# Patient Record
Sex: FEMALE | Race: WHITE
[De-identification: names, ages, dates, MRNs, and addresses within clinical notes are randomized per-mention and may not be internally consistent; named-entity substitution may affect disease eponyms.]

---

## 2019-01-14 ENCOUNTER — HOSPITAL ENCOUNTER (INPATIENT)
Dept: HOSPITAL 74 - YASAS | Age: 54
LOS: 15 days | Discharge: HOME | DRG: 772 | End: 2019-01-29
Attending: PSYCHIATRY & NEUROLOGY | Admitting: PSYCHIATRY & NEUROLOGY
Payer: COMMERCIAL

## 2019-01-14 VITALS — BODY MASS INDEX: 37 KG/M2

## 2019-01-14 DIAGNOSIS — K21.9: ICD-10-CM

## 2019-01-14 DIAGNOSIS — B18.2: ICD-10-CM

## 2019-01-14 DIAGNOSIS — F11.20: ICD-10-CM

## 2019-01-14 DIAGNOSIS — R26.2: ICD-10-CM

## 2019-01-14 DIAGNOSIS — J06.9: ICD-10-CM

## 2019-01-14 DIAGNOSIS — F14.20: Primary | ICD-10-CM

## 2019-01-14 DIAGNOSIS — J01.90: ICD-10-CM

## 2019-01-14 DIAGNOSIS — Z86.69: ICD-10-CM

## 2019-01-14 DIAGNOSIS — E66.9: ICD-10-CM

## 2019-01-14 DIAGNOSIS — R50.9: ICD-10-CM

## 2019-01-14 DIAGNOSIS — J45.909: ICD-10-CM

## 2019-01-14 DIAGNOSIS — F17.210: ICD-10-CM

## 2019-01-14 DIAGNOSIS — Z99.89: ICD-10-CM

## 2019-01-14 DIAGNOSIS — M54.40: ICD-10-CM

## 2019-01-14 DIAGNOSIS — R76.11: ICD-10-CM

## 2019-01-14 DIAGNOSIS — F31.81: ICD-10-CM

## 2019-01-14 LAB
APPEARANCE UR: CLEAR
BILIRUB UR STRIP.AUTO-MCNC: NEGATIVE MG/DL
COLOR UR: (no result)
EPITH CASTS URNS QL MICRO: (no result) /HPF
KETONES UR QL STRIP: NEGATIVE
LEUKOCYTE ESTERASE UR QL STRIP.AUTO: (no result)
NITRITE UR QL STRIP: NEGATIVE
PH UR: 7 [PH] (ref 5–8)
PROT UR QL STRIP: NEGATIVE
PROT UR QL STRIP: NEGATIVE
SP GR UR: 1.01 (ref 1.01–1.03)
UROBILINOGEN UR STRIP-MCNC: NEGATIVE MG/DL (ref 0.2–1)

## 2019-01-14 PROCEDURE — HZ42ZZZ GROUP COUNSELING FOR SUBSTANCE ABUSE TREATMENT, COGNITIVE-BEHAVIORAL: ICD-10-PCS | Performed by: PSYCHIATRY & NEUROLOGY

## 2019-01-14 RX ADMIN — Medication SCH MG: at 21:15

## 2019-01-14 RX ADMIN — GABAPENTIN SCH MG: 300 CAPSULE ORAL at 21:15

## 2019-01-14 RX ADMIN — Medication PRN MG: at 21:15

## 2019-01-14 RX ADMIN — GABAPENTIN SCH MG: 300 CAPSULE ORAL at 17:28

## 2019-01-14 RX ADMIN — NICOTINE POLACRILEX PRN MG: 4 GUM, CHEWING ORAL at 17:29

## 2019-01-14 NOTE — HP
CIWA Score





- Admission Criteria


[facility] Guidelines: Admission for Medically Managed Detox: 


Requires at least one of the followin. CIWA greater than 12


2. Seizures within the past 24 hours


3. Delirium tremens within the past 24 hours


4. Hallucinations within the past 24 hours


5. Acute intervention needed for co  occurring medical disorder


6. Acute intervention needed for co  occurring psychiatric disorder


7. Severe withdrawal that cannot be handled at a lower level of care (continued


    vomiting, continued diarrhea, abnormal vital signs) requiring intravenous


    medication and/or fluids


8. Pregnancy








Admission ROS S





- HPI


Allergies/Adverse Reactions: 


 Allergies











Allergy/AdvReac Type Severity Reaction Status Date / Time


 


No Known Allergies Allergy   Verified 19 11:14











Exam Limitations: No Limitations





- Ebola screening


Have you traveled outside of the country in the last 21 days: No


Have you had contact with anyone from an Ebola affected area: No


Have you been sick,other than usual withdrawal symptoms: No


Do you have a fever: No





- Review of Systems


Constitutional: Changes in sleep


EENT: reports: No Symptoms Reported


Respiratory: reports: No Symptoms reported


Cardiac: reports: No Symptoms Reported


GI: reports: Diarrhea, Poor Fluid Intake


: reports: No Symptoms Reported


Musculoskeletal: reports: Back Pain, Muscle Pain


Integumentary: reports: No Symptoms Reported


Neuro: reports: Seizure (drug and alcohol related seizures)


Endocrine: reports: No Symptoms Reported


Hematology: reports: No Symptoms Reported


Psychiatric: reports: Judgement Intact, Mood/Affect Appropiate, Orientated x3, 

Agitated, Anxious


Other Systems: Reviewed and Negative





Patient History





- Patient Medical History


Hx Anemia: No


Hx Asthma: Yes


Hx Chronic Obstructive Pulmonary Disease (COPD): No


Hx Cancer: No


Hx Cardiac Disorders: No


Hx Congestive Heart Failure: No


Hx Hypertension: No


Hx Hypercholesterolemia: No


Hx Pacemaker: No


HX Cerebrovascular Accident: No


Hx Seizures: Yes (drug related - last episode was in 2018)


Hx Dementia: No


Hx Diabetes: No


Hx Gastrointestinal Disorders: Yes (acid reflux)


Hx Genitourinary Disorders: No


Hx Sexually Transmitted Disorders: No


Hx Renal Disease (ESRD): No


Hx Thyroid Disease: No


Hx Human Immunodeficiency Virus (HIV): No (NEGATIVE HX)


Hx Hepatitis C: Yes (no treatment)


Hx Depression: Yes


Hx Suicide Attempt: No (10yrs ago )


Hx Bipolar Disorder: No


Hx Schizophrenia: No





- Patient Surgical History


Past Surgical History: No


Hx Neurologic Surgery: No


Hx Cataract Extraction: No


Hx Cardiac Surgery: No


Hx Lung Surgery: No


Hx Breast Surgery: No


Hx Breast Biopsy: No


Hx Abdominal Surgery: No


Hx Appendectomy: No


Hx Cholecystectomy: No


Hx Genitourinary Surgery: No


Hx  Section: No


Hx Orthopedic Surgery: No


Anesthesia Reaction: No





- PPD History


Previous Implant?: Yes


Documented Results: Positive w/o proof


Implanted On Prior [facility] Admission?: No


PPD to be Administered?: No





- Reproductive History


Patient is a Female of Child Bearing Age (11 -55 yrs old): No


Patient Pregnant: No





- Smoking Cessation


Smoking history: Current every day smoker


Have you smoked in the past 12 months: Yes


Aproximately how many cigarettes per day: 30


Hx Chewing Tobacco Use: No


Initiated information on smoking cessation: Yes


'Breaking Loose' booklet given: 19





- Substance & Tx. History


Hx Alcohol Use: Yes


Hx Substance Use: Yes


Substance Use Type: Alcohol, Cocaine


Hx Substance Use Treatment: Yes ([facility] completed 18)





- Substances Abused


  ** Cocaine


Route: Inhalation


Frequency: Daily


Amount used: $100


Age of first use: 22


Date of Last Use: 19





  ** Alcohol-beer


Route: Oral


Frequency: Daily


Amount used: 2-6 pks.


Age of first use: 21


Date of Last Use: 19





Family Disease History





- Family Disease History


Family Disease History: Diabetes: Father (ALCOHOLISM; DEPRESSION), Other: Father





Admission Physical Exam [facility]





- Vital Signs


Vital Signs: 


 Vital Signs - 24 hr











  19





  10:34


 


Temperature 98.3 F


 


Pulse Rate 69


 


Respiratory 16





Rate 


 


Blood Pressure 141/83














- Physical


General Appearance: Yes: Appropriately Dressed, Mild Distress, Obese, Tremorous

, Irritable, Anxious


HEENTM: Yes: Hearing grossly Normal, Normal Voice


Respiratory: Yes: Lungs Clear, Normal Breath Sounds, No Respiratory Distress


Neck: Yes: No masses,lesions,Nodules


Breast: Yes: Within Normal Limits


Cardiology: Yes: Regular Rhythm, Regular Rate, S1, S2


Abdominal: Yes: Normal Bowel Sounds, Non Tender, Soft


Genitourinary: Yes: Within Normal Limits


Back: Yes: Normal Inspection


Musculoskeletal: Yes: Back pain


Extremities: Yes: Normal Capillary Refill, Normal Inspection, Non-Tender, 

Tremors


Neurological: Yes: Fully Oriented, Alert, Normal Response


Integumentary: Yes: Diaphoresis


Lymphatic: Yes: Within Normal Limits





- Diagnostic


(1) Bipolar II disorder


Current Visit: Yes   Status: Chronic   





(2) Cocaine dependence


Current Visit: Yes   Status: Chronic   


Qualifiers: 


   Substance use status: uncomplicated   Qualified Code(s): F14.20 - Cocaine 

dependence, uncomplicated   





(3) Nicotine dependence


Current Visit: Yes   Status: Chronic   


Qualifiers: 


   Nicotine product type: cigarettes   Substance use status: uncomplicated   

Qualified Code(s): F17.210 - Nicotine dependence, cigarettes, uncomplicated   





(4) Depression


Current Visit: No   Status: Chronic   





(5) GERD (gastroesophageal reflux disease)


Current Visit: Yes   Status: Chronic   


Qualifiers: 


   Esophagitis presence: without esophagitis   Qualified Code(s): K21.9 - Gastro

-esophageal reflux disease without esophagitis   





(6) Seizure disorder


Current Visit: No   Status: Suspected   





Cleared for Admission [facility]





- Detox or Rehab


[facility] Level of Care: Medically Managed





[facility] Breath Alcohol Content


Breath Alcohol Content: 0





Urine Pregancy Test





- Result


Urine Pregnancy Test Results: Negative- NO Line Present





Urine Drug Screen





- Results


Drug Screen Negative: No


Urine Drug Screen Results: BZO-Benzodiazepines, MTD-Methadone





Inpatient Rehab Admission





- Initial Determination


Are CD services needed?: Yes


Free of communicable disease: Yes


Not in need of hospitalization: Yes





- Rehab Admission Criteria


Poor recovery environment: Yes


Comorbidities: Yes


Lacks judgement: Yes

## 2019-01-14 NOTE — PN
BHS Progress Note


Note: 





Psychiatric nurse practitioner note:


Call received by RN requesting patient's evening lithium dose. Patient reports 

taking lithium 150mg TID.  Chart and pharmacy claims reviewed. Labs pending. 

Unable to verify medication through pharmacy claims. Lithium WILL NOT be 

ordered at this time. Will order Lithium level for the morning.

## 2019-01-15 LAB
ALBUMIN SERPL-MCNC: 3.7 G/DL (ref 3.4–5)
ALP SERPL-CCNC: 84 U/L (ref 45–117)
ALT SERPL-CCNC: 29 U/L (ref 13–61)
ANION GAP SERPL CALC-SCNC: 4 MMOL/L (ref 8–16)
AST SERPL-CCNC: 25 U/L (ref 15–37)
BILIRUB SERPL-MCNC: 0.3 MG/DL (ref 0.2–1)
BUN SERPL-MCNC: 9 MG/DL (ref 7–18)
CALCIUM SERPL-MCNC: 8.9 MG/DL (ref 8.5–10.1)
CHLORIDE SERPL-SCNC: 105 MMOL/L (ref 98–107)
CO2 SERPL-SCNC: 32 MMOL/L (ref 21–32)
CREAT SERPL-MCNC: 0.8 MG/DL (ref 0.55–1.3)
DEPRECATED RDW RBC AUTO: 15 % (ref 11.6–15.6)
GLUCOSE SERPL-MCNC: 74 MG/DL (ref 74–106)
HCT VFR BLD CALC: 37.7 % (ref 32.4–45.2)
HGB BLD-MCNC: 12.3 GM/DL (ref 10.7–15.3)
MCH RBC QN AUTO: 29.4 PG (ref 25.7–33.7)
MCHC RBC AUTO-ENTMCNC: 32.6 G/DL (ref 32–36)
MCV RBC: 90.1 FL (ref 80–96)
PLATELET # BLD AUTO: 165 K/MM3 (ref 134–434)
PMV BLD: 10.3 FL (ref 7.5–11.1)
POTASSIUM SERPLBLD-SCNC: 4 MMOL/L (ref 3.5–5.1)
PROT SERPL-MCNC: 7.5 G/DL (ref 6.4–8.2)
RBC # BLD AUTO: 4.19 M/MM3 (ref 3.6–5.2)
SODIUM SERPL-SCNC: 140 MMOL/L (ref 136–145)
WBC # BLD AUTO: 4.3 K/MM3 (ref 4–10)

## 2019-01-15 RX ADMIN — LITHIUM CARBONATE SCH MG: 150 CAPSULE, GELATIN COATED ORAL at 21:22

## 2019-01-15 RX ADMIN — Medication PRN MG: at 21:23

## 2019-01-15 RX ADMIN — CYCLOBENZAPRINE HYDROCHLORIDE PRN MG: 10 TABLET, FILM COATED ORAL at 21:22

## 2019-01-15 RX ADMIN — Medication SCH MG: at 21:20

## 2019-01-15 RX ADMIN — METHADONE HYDROCHLORIDE SCH MG: 40 TABLET ORAL at 07:49

## 2019-01-15 RX ADMIN — GABAPENTIN SCH MG: 300 CAPSULE ORAL at 14:00

## 2019-01-15 RX ADMIN — GABAPENTIN SCH MG: 300 CAPSULE ORAL at 21:23

## 2019-01-15 RX ADMIN — PANTOPRAZOLE SODIUM SCH MG: 40 TABLET, DELAYED RELEASE ORAL at 09:21

## 2019-01-15 RX ADMIN — VENLAFAXINE HYDROCHLORIDE SCH MG: 75 TABLET ORAL at 09:20

## 2019-01-15 RX ADMIN — GABAPENTIN SCH MG: 300 CAPSULE ORAL at 06:33

## 2019-01-15 RX ADMIN — NICOTINE SCH MG: 21 PATCH TRANSDERMAL at 09:20

## 2019-01-15 RX ADMIN — Medication SCH TAB: at 09:21

## 2019-01-15 RX ADMIN — CYCLOBENZAPRINE HYDROCHLORIDE PRN MG: 10 TABLET, FILM COATED ORAL at 14:01

## 2019-01-15 RX ADMIN — Medication SCH APPLIC: at 21:25

## 2019-01-15 RX ADMIN — ALBUTEROL SULFATE PRN PUFF: 90 AEROSOL, METERED RESPIRATORY (INHALATION) at 15:17

## 2019-01-15 RX ADMIN — NICOTINE POLACRILEX PRN MG: 4 GUM, CHEWING ORAL at 14:02

## 2019-01-15 NOTE — HP
Psychiatrist Admission





- Data


Date of interview: 01/15/19


Admission source: [facility]


Identifying data: Patient is a 53 year old , mother of three, unemployed

, domiciled, and is supported by [facility]. This is patient's first admission to 

rehab at [facility]. Patient admitted to  rehabilitation for alcohol

, marijuana, and cocaine dependence.


Medical History: Asthma, seizures (drug related-last episode was in 6/2018), 

Hep C, GERD


Psychiatric History: Patient's first psychiatric contact was at 31 years of age 

after experiencing withdrawal symptoms (anxiety, tremors) from xanax. She 

reports being prescribed klonopin for approximately seven years. Patient 

reports h/o twelve psychiatric hospitalizations, most recently two years ago at 

[facility] after feeling depressed and suicidal. Ms. Fishman denies h

/o psychotic symptoms. Patient is also known to [facility], [facility], and [facility]. Diagnosis of Bipolar II disorder. 

Outpatient psychiatric care is provided by Dr. Barbosa at the [facility] clinic in the 

[place], [place]. States that she is prescribed abilify + Effexor 75mg XL + Gabapentin 

300mg TID + Klonopin 1mg BID + Lithium 150mg TID (patient unsure of certain 

dosages, will contact pharmacy). Patient reports h/o three suicide attempts (

overdose and  self mutilation). Ms. Fishman was discharged from [facility] detox 

on 1/14/19. States she received all of her medications during detox except for 

klonopin. At present she reports feeling tired but states her mood is stable.


Physical/Sexual Abuse/Trauma History: denies.


Vital Signs: 


 Vital Signs - 24 hr











  01/15/19 01/15/19 01/15/19





  00:30 03:30 07:17


 


Temperature   98.1 F


 


Pulse Rate   62


 


Respiratory 20 20 18





Rate   


 


Blood Pressure   109/74











Allergies/Adverse Reactions: 


 Allergies











Allergy/AdvReac Type Severity Reaction Status Date / Time


 


No Known Allergies Allergy   Verified 01/14/19 11:14











Date of last physical exam: 01/14/19


Concur with the findings of this exam: Yes





- Substance Abuse/Tx History


Hx Alcohol Use: Yes (6 pack daily)


Hx Substance Use: Yes (Cocaine- $20 daily  Marijuana- $10 daily)


Substance Use Type: Cocaine


Hx Substance Use Treatment: Yes ([facility])





Mental Status Exam





- Mental Status Exam


Alert and Oriented to: Time, Place, Person


Cognitive Function: Good


Patient Appearance: Well Groomed


Mood: Euthymic


Affect: Mood Congruent


Patient Behavior: Fatigued, Appropriate, Cooperative


Speech Pattern: Appropriate


Voice Loudness: Normal


Thought Process: Intact, Goal Oriented


Thought Disorder: Not Present


Hallucinations: Denies


Suicidal Ideation: Denies


Homicidal Ideation: Denies


Insight/Judgement: Poor


Sleep: Poorly


Appetite: Fair


Muscle strength/Tone: Normal


Gait/Station: Normal





Psychiatric Findings





- Problem List (Axis 1, 2,3)


(1) Bipolar II disorder


Current Visit: Yes   Status: Chronic   





(2) Cocaine dependence


Current Visit: Yes   Status: Chronic   


Qualifiers: 


   Substance use status: uncomplicated   Qualified Code(s): F14.20 - Cocaine 

dependence, uncomplicated   





(3) Methadone maintenance therapy patient


Current Visit: Yes   Status: Chronic   





(4) Nicotine dependence


Current Visit: Yes   Status: Chronic   


Qualifiers: 


   Nicotine product type: cigarettes   Substance use status: uncomplicated   

Qualified Code(s): F17.210 - Nicotine dependence, cigarettes, uncomplicated   





- Initial Treatment Plan


Initial Treatment Plan: Psychoeducation provided. Rehabilitation in progress. 

Writer contacted [facility] at [phone] and able to speak to 

pharmacist. As per Pharmacist patient is currently prescribed  Effexor 75mg XR 

+ Abilify 20mg daily + Klonopin 1mg BID + Gabapentin 600mg TID + Lithium 450mg 

BID. All medications were picked up on  1/2/19. As per nursing staff in [facility], 

patient's medication bottle of gabapenin was 300mg TID. Effexor 75 XR + 

Gabapentin 300mg TID was resumed by FNP.  Writer will resume abilify 20mg daily 

+  Lithium 450mg BID (renal levels within normal limits.) Current lithium level 

pending. Next lithium level lab ordered for 1/22/19 @ 0600. Benefits and side 

effects discussed. Verbal consent given.

## 2019-01-15 NOTE — PN
BHS Progress Note


Note: 


PATIENT ARRIVED TO UNIT YESTERDAY EVENING FOR REHAB SERVICES FOR HEROIN, 

COCAINE AND ETOH DEPENDENCE. PATIENT CURRENTLY ON MMTP. PMH INCLUDES HEPATITIS 

C (UNTREATED), ASTHMA, CHRONIC BACK PAIN/DISC HERNIATION, BIPOLAR DISORDER AND 

SEIZURES. PATIENT C/O BACK SPASMS TODAY BUT OTHERWISE REPORTS "I AM FINE, JUST 

TIRED". 


 Vital Signs











Temperature  98.1 F   01/15/19 07:17


 


Pulse Rate  62   01/15/19 07:17


 


Respiratory Rate  18   01/15/19 07:17


 


Blood Pressure  109/74   01/15/19 07:17


 


O2 Sat by Pulse Oximetry (%)      








 Laboratory Tests











  01/14/19 01/15/19 01/15/19





  15:29 05:45 05:45


 


WBC   4.3 


 


RBC   4.19 


 


Hgb   12.3 


 


Hct   37.7 


 


MCV   90.1 


 


MCH   29.4 


 


MCHC   32.6 


 


RDW   15.0 


 


Plt Count   165 


 


MPV   10.3 


 


Sodium    140


 


Potassium    4.0


 


Chloride    105


 


Carbon Dioxide    32


 


Anion Gap    4 L


 


BUN    9


 


Creatinine    0.8


 


Creat Clearance w eGFR    > 60


 


Random Glucose    74


 


Calcium    8.9


 


Total Bilirubin    0.3


 


AST    25


 


ALT    29


 


Alkaline Phosphatase    84


 


Total Protein    7.5


 


Albumin    3.7


 


Urine Color  Straw  


 


Urine Appearance  Clear  


 


Urine pH  7.0  


 


Ur Specific Gravity  1.009 L  


 


Urine Protein  Negative  


 


Urine Glucose (UA)  Negative  


 


Urine Ketones  Negative  


 


Urine Blood  Negative  


 


Urine Nitrite  Negative  


 


Urine Bilirubin  Negative  


 


Urine Urobilinogen  Negative  


 


Ur Leukocyte Esterase  Trace  


 


Urine WBC (Auto)  <1  


 


Urine RBC (Auto)  <1  


 


Ur Epithelial Cells  Rare  








PE:





ALERT AND ORIENTED X 3


SKIN WARM AND DRY


CAR S1S2


RESP CTA BL


GI +OBESE, BS+, NT,ND


EXT TRACE PEDAL EDEMA, BLE, +DRY SKIN TO HEELS





A/P:





MMTP


HEROIN/COCAINE/ETOH DEPENDENCE


SZD


ASTHMA


CHRONIC HEP C








WILL ADD FLEXERIL FOR BACK SPASMS


CONTINUE REHAB SERVICES AND ALL CURRENT ORDERS


MAY WEAR SLIPPERS ON UNIT


CONTINUE TO MONITOR

## 2019-01-15 NOTE — EKG
Test Reason : 

Blood Pressure : ***/*** mmHG

Vent. Rate : 064 BPM     Atrial Rate : 064 BPM

   P-R Int : 156 ms          QRS Dur : 078 ms

    QT Int : 466 ms       P-R-T Axes : 105 114 014 degrees

   QTc Int : 480 ms

 

*** SUSPECT ARM LEAD REVERSAL, INTERPRETATION ASSUMES NO REVERSAL

NORMAL SINUS RHYTHM

LEFT POSTERIOR FASCICULAR BLOCK

NONSPECIFIC T WAVE ABNORMALITY

ABNORMAL ECG

NO PREVIOUS ECGS AVAILABLE

Confirmed by MD ADRIÁN, RINA ([id number]) on 1/15/2019 6:19:05 PM

 

Referred By:             Confirmed By:RINA SAMPSON MD

## 2019-01-16 RX ADMIN — Medication SCH TAB: at 09:57

## 2019-01-16 RX ADMIN — CYCLOBENZAPRINE HYDROCHLORIDE PRN MG: 10 TABLET, FILM COATED ORAL at 09:59

## 2019-01-16 RX ADMIN — METHADONE HYDROCHLORIDE SCH MG: 40 TABLET ORAL at 06:23

## 2019-01-16 RX ADMIN — GABAPENTIN SCH MG: 300 CAPSULE ORAL at 21:15

## 2019-01-16 RX ADMIN — Medication SCH: at 09:57

## 2019-01-16 RX ADMIN — GABAPENTIN SCH MG: 300 CAPSULE ORAL at 13:26

## 2019-01-16 RX ADMIN — Medication PRN MG: at 21:16

## 2019-01-16 RX ADMIN — NICOTINE SCH MG: 21 PATCH TRANSDERMAL at 09:56

## 2019-01-16 RX ADMIN — NICOTINE POLACRILEX PRN MG: 4 GUM, CHEWING ORAL at 13:27

## 2019-01-16 RX ADMIN — LITHIUM CARBONATE SCH MG: 150 CAPSULE, GELATIN COATED ORAL at 10:05

## 2019-01-16 RX ADMIN — Medication SCH: at 21:15

## 2019-01-16 RX ADMIN — CYCLOBENZAPRINE HYDROCHLORIDE PRN MG: 10 TABLET, FILM COATED ORAL at 21:15

## 2019-01-16 RX ADMIN — VENLAFAXINE HYDROCHLORIDE SCH MG: 75 TABLET ORAL at 10:04

## 2019-01-16 RX ADMIN — GABAPENTIN SCH MG: 300 CAPSULE ORAL at 06:24

## 2019-01-16 RX ADMIN — LITHIUM CARBONATE SCH MG: 150 CAPSULE, GELATIN COATED ORAL at 21:15

## 2019-01-16 RX ADMIN — PANTOPRAZOLE SODIUM SCH MG: 40 TABLET, DELAYED RELEASE ORAL at 09:57

## 2019-01-16 RX ADMIN — Medication SCH MG: at 21:15

## 2019-01-16 NOTE — PN
B[facility] Progress Note


Note: 





PT REQUESTING CANE. HX SCIATICA. ALSO WANTS REVIEW OF CXR DONE YESTERDAY.


 Vital Signs - 24 hr











  01/16/19 01/16/19 01/16/19





  00:30 03:30 07:11


 


Temperature   97.7 F


 


Pulse Rate   58 L


 


Respiratory 20 20 18





Rate   


 


Blood Pressure   104/71








 Laboratory Tests











  01/14/19 01/15/19 01/15/19





  15:29 05:45 05:45


 


WBC   4.3 


 


RBC   4.19 


 


Hgb   12.3 


 


Hct   37.7 


 


MCV   90.1 


 


MCH   29.4 


 


MCHC   32.6 


 


RDW   15.0 


 


Plt Count   165 


 


MPV   10.3 


 


Sodium    140


 


Potassium    4.0


 


Chloride    105


 


Carbon Dioxide    32


 


Anion Gap    4 L


 


BUN    9


 


Creatinine    0.8


 


Creat Clearance w eGFR    > 60


 


Random Glucose    74


 


Calcium    8.9


 


Total Bilirubin    0.3


 


AST    25


 


ALT    29


 


Alkaline Phosphatase    84


 


Total Protein    7.5


 


Albumin    3.7


 


Urine Color  Straw  


 


Urine Appearance  Clear  


 


Urine pH  7.0  


 


Ur Specific Gravity  1.009 L  


 


Urine Protein  Negative  


 


Urine Glucose (UA)  Negative  


 


Urine Ketones  Negative  


 


Urine Blood  Negative  


 


Urine Nitrite  Negative  


 


Urine Bilirubin  Negative  


 


Urine Urobilinogen  Negative  


 


Ur Leukocyte Esterase  Trace  


 


Urine WBC (Auto)  <1  


 


Urine RBC (Auto)  <1  


 


Ur Epithelial Cells  Rare  


 


Lithium   


 


RPR Titer   














  01/15/19 01/15/19





  05:45 05:45


 


WBC  


 


RBC  


 


Hgb  


 


Hct  


 


MCV  


 


MCH  


 


MCHC  


 


RDW  


 


Plt Count  


 


MPV  


 


Sodium  


 


Potassium  


 


Chloride  


 


Carbon Dioxide  


 


Anion Gap  


 


BUN  


 


Creatinine  


 


Creat Clearance w eGFR  


 


Random Glucose  


 


Calcium  


 


Total Bilirubin  


 


AST  


 


ALT  


 


Alkaline Phosphatase  


 


Total Protein  


 


Albumin  


 


Urine Color  


 


Urine Appearance  


 


Urine pH  


 


Ur Specific Gravity  


 


Urine Protein  


 


Urine Glucose (UA)  


 


Urine Ketones  


 


Urine Blood  


 


Urine Nitrite  


 


Urine Bilirubin  


 


Urine Urobilinogen  


 


Ur Leukocyte Esterase  


 


Urine WBC (Auto)  


 


Urine RBC (Auto)  


 


Ur Epithelial Cells  


 


Lithium   1.3 H


 


RPR Titer  Nonreactive 








CXR NO ACUTE DISEASE





NAD





PLAN;CANE AS DIRECTED

## 2019-01-17 RX ADMIN — Medication SCH: at 21:11

## 2019-01-17 RX ADMIN — GABAPENTIN SCH MG: 300 CAPSULE ORAL at 21:10

## 2019-01-17 RX ADMIN — Medication SCH APPLIC: at 09:47

## 2019-01-17 RX ADMIN — PANTOPRAZOLE SODIUM SCH MG: 40 TABLET, DELAYED RELEASE ORAL at 09:47

## 2019-01-17 RX ADMIN — GABAPENTIN SCH MG: 300 CAPSULE ORAL at 13:57

## 2019-01-17 RX ADMIN — LITHIUM CARBONATE SCH MG: 150 CAPSULE, GELATIN COATED ORAL at 09:47

## 2019-01-17 RX ADMIN — Medication SCH EACH: at 21:11

## 2019-01-17 RX ADMIN — CYCLOBENZAPRINE HYDROCHLORIDE PRN MG: 10 TABLET, FILM COATED ORAL at 21:10

## 2019-01-17 RX ADMIN — MAGNESIUM HYDROXIDE PRN ML: 400 SUSPENSION ORAL at 21:11

## 2019-01-17 RX ADMIN — Medication PRN MG: at 21:10

## 2019-01-17 RX ADMIN — CYCLOBENZAPRINE HYDROCHLORIDE PRN MG: 10 TABLET, FILM COATED ORAL at 10:10

## 2019-01-17 RX ADMIN — NICOTINE SCH MG: 21 PATCH TRANSDERMAL at 09:47

## 2019-01-17 RX ADMIN — VENLAFAXINE HYDROCHLORIDE SCH MG: 75 TABLET ORAL at 10:09

## 2019-01-17 RX ADMIN — NICOTINE POLACRILEX PRN MG: 4 GUM, CHEWING ORAL at 08:57

## 2019-01-17 RX ADMIN — LIDOCAINE SCH PATCH: 50 PATCH TOPICAL at 10:47

## 2019-01-17 RX ADMIN — METHADONE HYDROCHLORIDE SCH MG: 40 TABLET ORAL at 06:13

## 2019-01-17 RX ADMIN — IBUPROFEN PRN MG: 400 TABLET, FILM COATED ORAL at 10:10

## 2019-01-17 RX ADMIN — Medication SCH TAB: at 09:48

## 2019-01-17 RX ADMIN — GABAPENTIN SCH MG: 300 CAPSULE ORAL at 06:14

## 2019-01-17 RX ADMIN — Medication SCH MG: at 21:10

## 2019-01-17 NOTE — PN
Psychiatric Progress Note


Vital Signs: 


 Vital Signs











 Period  Temp  Pulse  Resp  BP Sys/Aparicio  Pulse Ox


 


 Last 24 Hr  97.7 F  59  18-20  113/76  











Date of Session: 01/17/19


Chief Complaint:: "lithium level 1.3"


HPI: Patient admitted to 3E rehabilitation for alcohol, marijuana, and cocaine 

dependence co-morbid Bipolar disorder.


ROS: Asthma, seizures (drug related-last episode was in 6/2018), Hep C, GERD


Current Medications: 


Active Medications











Generic Name Dose Route Start Last Admin





  Trade Name Freq  PRN Reason Stop Dose Admin


 


Acetaminophen  650 mg  01/14/19 14:26  





  Tylenol -  PO   





  Q4H PRN   





  FEVER   





     





     





     


 


Al Hydroxide/Mg Hydroxide  30 ml  01/14/19 14:26  





  Mylanta Oral Suspension -  PO   





  Q6H PRN   





  DYSPEPSIA   





     





     





     


 


Albuterol Sulfate  2 puff  01/14/19 14:28  01/15/19 15:17





  Ventolin Hfa Inhaler -  IH   2 puff





  Q4H PRN   Administration





  ASTHMA   





     





     





     


 


Aripiprazole  20 mg  01/16/19 10:00  01/17/19 09:48





  Abilify  PO   20 mg





  DAILY ADELE   Administration





     





     





     





     


 


Cyclobenzaprine HCl  10 mg  01/15/19 09:56  01/17/19 10:10





  Flexeril -  PO   10 mg





  TID PRN   Administration





  MUSCLE SPASMS   





     





     





     


 


Eucalyptus/Menthol/Phenol/Sorbitol  1 each  01/14/19 14:26  





  Cepastat Lozenge -  MM   





  Q4H PRN   





  SORE THROAT   





     





     





     


 


Gabapentin  300 mg  01/14/19 14:30  01/17/19 06:14





  Neurontin -  PO   300 mg





  TID ADELE   Administration





     





     





     





     


 


Guaifenesin  10 ml  01/14/19 14:26  





  Robitussin Dm -  PO   





  Q6H PRN   





  COUGH   





     





     





     


 


Hydroxyzine Pamoate  50 mg  01/14/19 14:26  





  Vistaril -  PO   





  Q4H PRN   





  AGITATION   





     





     





     


 


Ibuprofen  400 mg  01/14/19 14:26  01/17/19 10:10





  Motrin -  PO   400 mg





  Q6H PRN   Administration





  Pain level 4-6   





     





     





     


 


Lidocaine  1 patch  01/17/19 10:15  01/17/19 10:47





  Lidoderm Patch -  TP   1 patch





  DAILY ADELE   Administration





     





     





     





     


 


Lithium Carbonate  450 mg  01/15/19 22:00  01/17/19 09:47





  Eskalith -  PO   450 mg





  BID ADELE   Administration





     





     





     





     


 


Loperamide HCl  4 mg  01/14/19 14:26  





  Imodium -  PO   





  Q6H PRN   





  DIARRHEA   





     





     





     


 


Magnesium Citrate  300 ml  01/14/19 14:26  





  Citroma -  PO   





  Q48H PRN   





  CONSTIPATION   





     





     





     


 


Magnesium Hydroxide  30 ml  01/14/19 14:26  





  Milk Of Magnesia -  PO   





  DAILY PRN   





  CONSTIPATION   





     





     





     


 


Melatonin  5 mg  01/14/19 22:00  01/16/19 21:16





  Melatonin  PO   5 mg





  HS PRN   Administration





  INSOMNIA   





     





     





     


 


Methadone HCl 120 mg/  130 mg  01/15/19 07:45  01/17/19 06:13





  Methadone HCl 10 mg  PO   130 mg





  DAILY@0600 ADELE   Administration





     





     





     





     


 


Miscellaneous  1 each  01/17/19 22:00  





  Lidoderm Patch Removal  MC   





  DAILY@2200 [facility]   





     





     





     





     


 


Multi-Ingredient Lotion  1 applic  01/15/19 22:00  01/17/19 09:47





  Eucerin (Small Jar) -  TP   1 applic





  BID ADELE   Administration





     





     





     





     


 


Nicotine  21 mg  01/15/19 10:00  01/17/19 09:47





  Nicoderm Patch -  TD   21 mg





  DAILY ADELE   Administration





     





     





     





     


 


Nicotine Polacrilex  4 mg  01/14/19 14:26  01/17/19 08:57





  Nicorette Gum -  BC   4 mg





  Q2H PRN   Administration





  NICOTINE REPLACEMENT RX   





     





     





     


 


Pantoprazole Sodium  40 mg  01/15/19 10:00  01/17/19 09:47





  Protonix -  PO   40 mg





  DAILY ADELE   Administration





     





     





     





     


 


Prenatal Multivit/Folic Acid/Iron  1 tab  01/15/19 10:00  01/17/19 09:48





  Prenatal Vitamins (Sjr) -  PO   1 tab





  DAILY ADELE   Administration





     





     





     





     


 


Pseudoephedrine/Triprolidine  1 combo  01/14/19 14:26  





  Actifed -  PO   





  TID PRN   





  NASAL CONGESTION   





     





     





     


 


Thiamine HCl  100 mg  01/14/19 22:00  01/16/19 21:15





  Vitamin B1 -  PO   100 mg





  HS ADELE   Administration





     





     





     





     


 


Venlafaxine HCl  75 mg  01/15/19 10:00  01/17/19 10:09





  Effexor -  PO   75 mg





  DAILY ADELE   Administration





     





     





     





     











Medication(s) Change(s): Yes. Will hold lithium level


Current Side Effect: No


Lab tests ordered: No


Lab tests reviewed: Yes


Provider note:: Patient's lithium level was 1.3 on 1/15/19. Chart reviewed. 

Patient was resumed on lithium 450mg BID on the evening of 1/15/19 after writer 

was able to verify dose with [facility]. Patient did not receive her 

evening dose of lithium on 1/14/19. Patient calm and cooperative. Alert and 

oriented X3. No signs of lithium toxicity noted.  Lithium medication to be held 

due to mildly elevated lithium level of 1.3 on 1/15/19.  Will order lithium 

level for the morning of 1/18/19 and discontinue lithium level ordered for 1/22/ 18. Patient educated on the signs of symptoms of lithium toxicity. Patient 

agreeable with current plan. Will consult with unit psychiatrist Dr. zavala 

tomorrow morning.


Total face to face time:: 25





Mental Status Exam





- Mental Status Exam


Alert and Oriented to: Time, Place, Person


Cognitive Function: Good


Patient Appearance: Well Groomed


Mood: Euthymic


Affect: Mood Congruent


Patient Behavior: Fatigued, Cooperative


Speech Pattern: Clear


Voice Loudness: Moderately Soft/Quiet


Thought Process: Intact, Goal Oriented


Thought Disorder: Not Present


Hallucinations: Denies


Suicidal Ideation: Denies


Homicidal Ideation: Denies


Insight/Judgement: Poor


Sleep: Fair


Appetite: Fair


Muscle strength/Tone: Normal


Gait/Station: Other (Patient ambulates with assistance with a cane.)





Psychiatric Treatment Plan





- Problem List


(1) Bipolar II disorder


Current Visit: Yes   





(2) Cocaine dependence


Current Visit: Yes   


Qualifiers: 


   Substance use status: uncomplicated   Qualified Code(s): F14.20 - Cocaine 

dependence, uncomplicated   





(3) Methadone maintenance therapy patient


Current Visit: Yes   





(4) Nicotine dependence


Current Visit: Yes   


Qualifiers: 


   Nicotine product type: cigarettes   Substance use status: uncomplicated   

Qualified Code(s): F17.210 - Nicotine dependence, cigarettes, uncomplicated

## 2019-01-17 NOTE — PN
[facility]HS Progress Note


Note: 





PATIENT C/O CHRONIC LBP. FLEXERIL IN PROGRESS AS NEEDED. PATIENT ENCOURAGED TO 

REQUEST MEDICATION WHEN PAIN OCCURS. WILL ADD LIDOCAINE PATCH TO REGIMEN AS 

PATIENT TREATED WITH PATCHES IN PAST WITH SOME RELIEF. PATIENT HAS H/O 

HERNIATED DISC AND CONDITION CHRONIC. WILL CONTINUE TO MONITOR CLINICALLY. 


 Vital Signs











Temperature  97.7 F   01/17/19 06:42


 


Pulse Rate  59 L  01/17/19 06:42


 


Respiratory Rate  18   01/17/19 06:42


 


Blood Pressure  113/76   01/17/19 06:42


 


O2 Sat by Pulse Oximetry (%)

## 2019-01-18 RX ADMIN — METHADONE HYDROCHLORIDE SCH MG: 40 TABLET ORAL at 06:15

## 2019-01-18 RX ADMIN — GABAPENTIN SCH MG: 300 CAPSULE ORAL at 21:05

## 2019-01-18 RX ADMIN — VENLAFAXINE HYDROCHLORIDE SCH MG: 75 TABLET ORAL at 09:52

## 2019-01-18 RX ADMIN — Medication SCH: at 21:05

## 2019-01-18 RX ADMIN — NICOTINE POLACRILEX PRN MG: 4 GUM, CHEWING ORAL at 12:54

## 2019-01-18 RX ADMIN — Medication SCH EACH: at 21:05

## 2019-01-18 RX ADMIN — Medication SCH: at 09:54

## 2019-01-18 RX ADMIN — PANTOPRAZOLE SODIUM SCH MG: 40 TABLET, DELAYED RELEASE ORAL at 09:52

## 2019-01-18 RX ADMIN — NICOTINE SCH: 21 PATCH TRANSDERMAL at 09:54

## 2019-01-18 RX ADMIN — Medication SCH MG: at 21:05

## 2019-01-18 RX ADMIN — ALBUTEROL SULFATE PRN PUFF: 90 AEROSOL, METERED RESPIRATORY (INHALATION) at 12:54

## 2019-01-18 RX ADMIN — MAGNESIUM HYDROXIDE PRN ML: 400 SUSPENSION ORAL at 14:22

## 2019-01-18 RX ADMIN — GABAPENTIN SCH MG: 300 CAPSULE ORAL at 06:15

## 2019-01-18 RX ADMIN — NICOTINE POLACRILEX PRN MG: 4 GUM, CHEWING ORAL at 09:57

## 2019-01-18 RX ADMIN — CYCLOBENZAPRINE HYDROCHLORIDE PRN MG: 10 TABLET, FILM COATED ORAL at 21:05

## 2019-01-18 RX ADMIN — LIDOCAINE SCH PATCH: 50 PATCH TOPICAL at 09:52

## 2019-01-18 RX ADMIN — GABAPENTIN SCH MG: 300 CAPSULE ORAL at 14:17

## 2019-01-18 RX ADMIN — Medication SCH TAB: at 09:52

## 2019-01-18 RX ADMIN — CYCLOBENZAPRINE HYDROCHLORIDE PRN MG: 10 TABLET, FILM COATED ORAL at 09:53

## 2019-01-19 RX ADMIN — CYCLOBENZAPRINE HYDROCHLORIDE PRN MG: 10 TABLET, FILM COATED ORAL at 21:17

## 2019-01-19 RX ADMIN — Medication SCH TAB: at 09:35

## 2019-01-19 RX ADMIN — NICOTINE SCH MG: 21 PATCH TRANSDERMAL at 09:35

## 2019-01-19 RX ADMIN — GABAPENTIN SCH MG: 300 CAPSULE ORAL at 06:33

## 2019-01-19 RX ADMIN — GABAPENTIN SCH MG: 300 CAPSULE ORAL at 21:18

## 2019-01-19 RX ADMIN — NICOTINE POLACRILEX PRN MG: 4 GUM, CHEWING ORAL at 09:00

## 2019-01-19 RX ADMIN — LIDOCAINE SCH PATCH: 50 PATCH TOPICAL at 09:35

## 2019-01-19 RX ADMIN — VENLAFAXINE HYDROCHLORIDE SCH MG: 75 TABLET ORAL at 09:36

## 2019-01-19 RX ADMIN — METHADONE HYDROCHLORIDE SCH MG: 40 TABLET ORAL at 06:33

## 2019-01-19 RX ADMIN — Medication SCH MG: at 21:17

## 2019-01-19 RX ADMIN — Medication SCH EACH: at 21:17

## 2019-01-19 RX ADMIN — GABAPENTIN SCH MG: 300 CAPSULE ORAL at 14:08

## 2019-01-19 RX ADMIN — PANTOPRAZOLE SODIUM SCH MG: 40 TABLET, DELAYED RELEASE ORAL at 09:36

## 2019-01-19 RX ADMIN — LITHIUM CARBONATE SCH MG: 150 CAPSULE, GELATIN COATED ORAL at 10:32

## 2019-01-19 RX ADMIN — Medication SCH APPLIC: at 09:37

## 2019-01-19 RX ADMIN — Medication SCH: at 21:17

## 2019-01-19 RX ADMIN — Medication PRN MG: at 21:18

## 2019-01-19 RX ADMIN — CYCLOBENZAPRINE HYDROCHLORIDE PRN MG: 10 TABLET, FILM COATED ORAL at 09:38

## 2019-01-19 RX ADMIN — LITHIUM CARBONATE SCH MG: 150 CAPSULE, GELATIN COATED ORAL at 21:17

## 2019-01-19 NOTE — PN
BHS Progress Note


Note: 





Psychiatric nurse practitioner note:


Lithium level on 1/18/19 was 0.6 . Lithium was held after results were 1.3 on 1/

15/19. Patient informed of her results and made aware that  lithium 450mg BID 

will be resumed. Patient told writer that when she was at [facility] she was given 

lithium 450mg TID as oppose to what she is prescribed by her outpatient 

psychiatrist of 450mg BID. Verbal consent given. Will resume lithium 450mg BID.

## 2019-01-20 RX ADMIN — Medication SCH MG: at 21:24

## 2019-01-20 RX ADMIN — GABAPENTIN SCH MG: 300 CAPSULE ORAL at 13:41

## 2019-01-20 RX ADMIN — NICOTINE POLACRILEX PRN MG: 4 GUM, CHEWING ORAL at 06:53

## 2019-01-20 RX ADMIN — NICOTINE POLACRILEX PRN MG: 4 GUM, CHEWING ORAL at 17:16

## 2019-01-20 RX ADMIN — CYCLOBENZAPRINE HYDROCHLORIDE PRN MG: 10 TABLET, FILM COATED ORAL at 21:25

## 2019-01-20 RX ADMIN — IBUPROFEN PRN MG: 400 TABLET, FILM COATED ORAL at 21:28

## 2019-01-20 RX ADMIN — Medication SCH APPLIC: at 21:27

## 2019-01-20 RX ADMIN — ALBUTEROL SULFATE PRN PUFF: 90 AEROSOL, METERED RESPIRATORY (INHALATION) at 10:23

## 2019-01-20 RX ADMIN — PANTOPRAZOLE SODIUM SCH MG: 40 TABLET, DELAYED RELEASE ORAL at 09:50

## 2019-01-20 RX ADMIN — HYDROXYZINE PAMOATE PRN MG: 50 CAPSULE ORAL at 09:52

## 2019-01-20 RX ADMIN — Medication SCH EACH: at 21:27

## 2019-01-20 RX ADMIN — GABAPENTIN SCH MG: 300 CAPSULE ORAL at 06:41

## 2019-01-20 RX ADMIN — HYDROXYZINE PAMOATE PRN MG: 50 CAPSULE ORAL at 21:27

## 2019-01-20 RX ADMIN — IBUPROFEN PRN MG: 400 TABLET, FILM COATED ORAL at 06:43

## 2019-01-20 RX ADMIN — VENLAFAXINE HYDROCHLORIDE SCH MG: 75 TABLET ORAL at 09:51

## 2019-01-20 RX ADMIN — LITHIUM CARBONATE SCH MG: 150 CAPSULE, GELATIN COATED ORAL at 09:51

## 2019-01-20 RX ADMIN — Medication SCH TAB: at 09:51

## 2019-01-20 RX ADMIN — LITHIUM CARBONATE SCH MG: 150 CAPSULE, GELATIN COATED ORAL at 21:27

## 2019-01-20 RX ADMIN — CYCLOBENZAPRINE HYDROCHLORIDE PRN MG: 10 TABLET, FILM COATED ORAL at 06:43

## 2019-01-20 RX ADMIN — GABAPENTIN SCH MG: 300 CAPSULE ORAL at 21:25

## 2019-01-20 RX ADMIN — NICOTINE POLACRILEX PRN MG: 4 GUM, CHEWING ORAL at 09:53

## 2019-01-20 RX ADMIN — LIDOCAINE SCH PATCH: 50 PATCH TOPICAL at 09:52

## 2019-01-20 RX ADMIN — NICOTINE SCH MG: 21 PATCH TRANSDERMAL at 09:50

## 2019-01-20 RX ADMIN — METHADONE HYDROCHLORIDE SCH MG: 40 TABLET ORAL at 06:40

## 2019-01-20 RX ADMIN — Medication SCH: at 09:51

## 2019-01-21 RX ADMIN — ALBUTEROL SULFATE PRN PUFF: 90 AEROSOL, METERED RESPIRATORY (INHALATION) at 10:14

## 2019-01-21 RX ADMIN — Medication SCH: at 09:45

## 2019-01-21 RX ADMIN — HYDROXYZINE PAMOATE PRN MG: 50 CAPSULE ORAL at 21:07

## 2019-01-21 RX ADMIN — Medication SCH TAB: at 09:42

## 2019-01-21 RX ADMIN — NICOTINE POLACRILEX PRN MG: 4 GUM, CHEWING ORAL at 13:40

## 2019-01-21 RX ADMIN — Medication PRN APPLIC: at 13:39

## 2019-01-21 RX ADMIN — CYCLOBENZAPRINE HYDROCHLORIDE PRN MG: 10 TABLET, FILM COATED ORAL at 21:07

## 2019-01-21 RX ADMIN — Medication SCH MG: at 21:07

## 2019-01-21 RX ADMIN — GABAPENTIN SCH MG: 300 CAPSULE ORAL at 06:12

## 2019-01-21 RX ADMIN — PANTOPRAZOLE SODIUM SCH MG: 40 TABLET, DELAYED RELEASE ORAL at 09:42

## 2019-01-21 RX ADMIN — IBUPROFEN PRN MG: 400 TABLET, FILM COATED ORAL at 09:42

## 2019-01-21 RX ADMIN — NICOTINE SCH: 21 PATCH TRANSDERMAL at 09:44

## 2019-01-21 RX ADMIN — NICOTINE POLACRILEX PRN MG: 4 GUM, CHEWING ORAL at 21:09

## 2019-01-21 RX ADMIN — LIDOCAINE SCH PATCH: 50 PATCH TOPICAL at 09:44

## 2019-01-21 RX ADMIN — LITHIUM CARBONATE SCH MG: 150 CAPSULE, GELATIN COATED ORAL at 21:06

## 2019-01-21 RX ADMIN — LITHIUM CARBONATE SCH MG: 150 CAPSULE, GELATIN COATED ORAL at 09:42

## 2019-01-21 RX ADMIN — METHADONE HYDROCHLORIDE SCH MG: 40 TABLET ORAL at 06:12

## 2019-01-21 RX ADMIN — GABAPENTIN SCH MG: 300 CAPSULE ORAL at 21:07

## 2019-01-21 RX ADMIN — HYDROXYZINE PAMOATE PRN MG: 50 CAPSULE ORAL at 09:42

## 2019-01-21 RX ADMIN — Medication SCH EACH: at 21:07

## 2019-01-21 RX ADMIN — NICOTINE POLACRILEX PRN MG: 4 GUM, CHEWING ORAL at 09:45

## 2019-01-21 RX ADMIN — VENLAFAXINE HYDROCHLORIDE SCH MG: 75 TABLET ORAL at 09:43

## 2019-01-21 RX ADMIN — Medication SCH: at 21:07

## 2019-01-21 RX ADMIN — CYCLOBENZAPRINE HYDROCHLORIDE PRN MG: 10 TABLET, FILM COATED ORAL at 13:38

## 2019-01-21 RX ADMIN — GABAPENTIN SCH MG: 300 CAPSULE ORAL at 13:38

## 2019-01-22 RX ADMIN — HYDROXYZINE PAMOATE PRN MG: 50 CAPSULE ORAL at 09:39

## 2019-01-22 RX ADMIN — Medication SCH EACH: at 21:13

## 2019-01-22 RX ADMIN — NICOTINE POLACRILEX PRN MG: 4 GUM, CHEWING ORAL at 13:06

## 2019-01-22 RX ADMIN — Medication SCH TAB: at 09:39

## 2019-01-22 RX ADMIN — Medication SCH MG: at 21:13

## 2019-01-22 RX ADMIN — HYDROXYZINE PAMOATE PRN MG: 50 CAPSULE ORAL at 21:13

## 2019-01-22 RX ADMIN — PANTOPRAZOLE SODIUM SCH MG: 40 TABLET, DELAYED RELEASE ORAL at 09:39

## 2019-01-22 RX ADMIN — NICOTINE POLACRILEX PRN MG: 4 GUM, CHEWING ORAL at 09:40

## 2019-01-22 RX ADMIN — Medication SCH: at 09:39

## 2019-01-22 RX ADMIN — LITHIUM CARBONATE SCH MG: 150 CAPSULE, GELATIN COATED ORAL at 21:12

## 2019-01-22 RX ADMIN — ALBUTEROL SULFATE PRN PUFF: 90 AEROSOL, METERED RESPIRATORY (INHALATION) at 09:56

## 2019-01-22 RX ADMIN — CYCLOBENZAPRINE HYDROCHLORIDE PRN MG: 10 TABLET, FILM COATED ORAL at 06:23

## 2019-01-22 RX ADMIN — GABAPENTIN SCH MG: 300 CAPSULE ORAL at 14:01

## 2019-01-22 RX ADMIN — LITHIUM CARBONATE SCH MG: 150 CAPSULE, GELATIN COATED ORAL at 09:38

## 2019-01-22 RX ADMIN — Medication SCH: at 21:13

## 2019-01-22 RX ADMIN — MAGNESIUM HYDROXIDE PRN ML: 400 SUSPENSION ORAL at 19:11

## 2019-01-22 RX ADMIN — NICOTINE SCH: 21 PATCH TRANSDERMAL at 09:39

## 2019-01-22 RX ADMIN — METHADONE HYDROCHLORIDE SCH MG: 40 TABLET ORAL at 06:21

## 2019-01-22 RX ADMIN — VENLAFAXINE HYDROCHLORIDE SCH MG: 75 TABLET ORAL at 09:38

## 2019-01-22 RX ADMIN — GABAPENTIN SCH MG: 300 CAPSULE ORAL at 06:22

## 2019-01-22 RX ADMIN — CYCLOBENZAPRINE HYDROCHLORIDE PRN MG: 10 TABLET, FILM COATED ORAL at 21:12

## 2019-01-22 RX ADMIN — ALBUTEROL SULFATE PRN PUFF: 90 AEROSOL, METERED RESPIRATORY (INHALATION) at 06:24

## 2019-01-22 RX ADMIN — LIDOCAINE SCH PATCH: 50 PATCH TOPICAL at 09:37

## 2019-01-22 RX ADMIN — GABAPENTIN SCH MG: 300 CAPSULE ORAL at 21:13

## 2019-01-23 RX ADMIN — GABAPENTIN SCH MG: 300 CAPSULE ORAL at 13:01

## 2019-01-23 RX ADMIN — ALBUTEROL SULFATE PRN PUFF: 90 AEROSOL, METERED RESPIRATORY (INHALATION) at 06:33

## 2019-01-23 RX ADMIN — LITHIUM CARBONATE SCH MG: 150 CAPSULE, GELATIN COATED ORAL at 09:26

## 2019-01-23 RX ADMIN — Medication SCH EACH: at 21:26

## 2019-01-23 RX ADMIN — IBUPROFEN PRN MG: 400 TABLET, FILM COATED ORAL at 21:27

## 2019-01-23 RX ADMIN — HYDROCORTISONE SCH: 1 CREAM TOPICAL at 21:25

## 2019-01-23 RX ADMIN — NICOTINE POLACRILEX PRN MG: 4 GUM, CHEWING ORAL at 10:37

## 2019-01-23 RX ADMIN — VENLAFAXINE HYDROCHLORIDE SCH MG: 75 TABLET ORAL at 09:26

## 2019-01-23 RX ADMIN — Medication SCH: at 09:27

## 2019-01-23 RX ADMIN — Medication SCH MG: at 21:25

## 2019-01-23 RX ADMIN — LITHIUM CARBONATE SCH MG: 150 CAPSULE, GELATIN COATED ORAL at 21:25

## 2019-01-23 RX ADMIN — LIDOCAINE SCH PATCH: 50 PATCH TOPICAL at 09:27

## 2019-01-23 RX ADMIN — GABAPENTIN SCH MG: 300 CAPSULE ORAL at 21:25

## 2019-01-23 RX ADMIN — HYDROCORTISONE SCH APPLIC: 1 CREAM TOPICAL at 12:55

## 2019-01-23 RX ADMIN — Medication PRN MG: at 21:25

## 2019-01-23 RX ADMIN — CYCLOBENZAPRINE HYDROCHLORIDE PRN MG: 10 TABLET, FILM COATED ORAL at 06:33

## 2019-01-23 RX ADMIN — HYDROXYZINE PAMOATE PRN MG: 50 CAPSULE ORAL at 09:28

## 2019-01-23 RX ADMIN — NICOTINE SCH: 21 PATCH TRANSDERMAL at 09:28

## 2019-01-23 RX ADMIN — METHADONE HYDROCHLORIDE SCH MG: 40 TABLET ORAL at 06:31

## 2019-01-23 RX ADMIN — HYDROXYZINE PAMOATE PRN MG: 50 CAPSULE ORAL at 21:27

## 2019-01-23 RX ADMIN — CYCLOBENZAPRINE HYDROCHLORIDE PRN MG: 10 TABLET, FILM COATED ORAL at 21:25

## 2019-01-23 RX ADMIN — Medication SCH TAB: at 09:27

## 2019-01-23 RX ADMIN — PANTOPRAZOLE SODIUM SCH MG: 40 TABLET, DELAYED RELEASE ORAL at 09:27

## 2019-01-23 RX ADMIN — Medication SCH: at 21:25

## 2019-01-23 RX ADMIN — GABAPENTIN SCH MG: 300 CAPSULE ORAL at 06:33

## 2019-01-23 NOTE — PN
BHS Progress Note


Note: 





c/o itchiness to both lower legs.


 


 


 Vital Signs











  01/23/19





  06:35


 


Temperature 97.7 F


 


Pulse Rate 73


 


Respiratory 18





Rate 


 


Blood Pressure 134/79





 Redness and escoraited  to skin on both anterior shin 








plan;hydrocortisone cream1% apply as directed

## 2019-01-24 RX ADMIN — CYCLOBENZAPRINE HYDROCHLORIDE SCH MG: 10 TABLET, FILM COATED ORAL at 13:48

## 2019-01-24 RX ADMIN — VENLAFAXINE HYDROCHLORIDE SCH MG: 75 TABLET ORAL at 09:33

## 2019-01-24 RX ADMIN — HYDROXYZINE PAMOATE PRN MG: 50 CAPSULE ORAL at 21:30

## 2019-01-24 RX ADMIN — CYCLOBENZAPRINE HYDROCHLORIDE PRN MG: 10 TABLET, FILM COATED ORAL at 06:36

## 2019-01-24 RX ADMIN — Medication SCH MG: at 21:30

## 2019-01-24 RX ADMIN — ALBUTEROL SULFATE PRN PUFF: 90 AEROSOL, METERED RESPIRATORY (INHALATION) at 09:31

## 2019-01-24 RX ADMIN — PANTOPRAZOLE SODIUM SCH MG: 40 TABLET, DELAYED RELEASE ORAL at 09:33

## 2019-01-24 RX ADMIN — LIDOCAINE SCH PATCH: 50 PATCH TOPICAL at 09:34

## 2019-01-24 RX ADMIN — GABAPENTIN SCH MG: 300 CAPSULE ORAL at 06:36

## 2019-01-24 RX ADMIN — NICOTINE POLACRILEX PRN MG: 4 GUM, CHEWING ORAL at 09:37

## 2019-01-24 RX ADMIN — Medication SCH: at 21:31

## 2019-01-24 RX ADMIN — LITHIUM CARBONATE SCH MG: 150 CAPSULE, GELATIN COATED ORAL at 09:34

## 2019-01-24 RX ADMIN — Medication SCH EACH: at 21:31

## 2019-01-24 RX ADMIN — CYCLOBENZAPRINE HYDROCHLORIDE SCH MG: 10 TABLET, FILM COATED ORAL at 21:30

## 2019-01-24 RX ADMIN — LITHIUM CARBONATE SCH MG: 150 CAPSULE, GELATIN COATED ORAL at 21:30

## 2019-01-24 RX ADMIN — GABAPENTIN SCH MG: 300 CAPSULE ORAL at 21:30

## 2019-01-24 RX ADMIN — Medication SCH TAB: at 09:33

## 2019-01-24 RX ADMIN — Medication SCH APPLIC: at 09:37

## 2019-01-24 RX ADMIN — METHADONE HYDROCHLORIDE SCH MG: 40 TABLET ORAL at 06:36

## 2019-01-24 RX ADMIN — GABAPENTIN SCH MG: 300 CAPSULE ORAL at 13:48

## 2019-01-24 RX ADMIN — HYDROCORTISONE SCH: 1 CREAM TOPICAL at 21:31

## 2019-01-24 RX ADMIN — HYDROCORTISONE SCH APPLIC: 1 CREAM TOPICAL at 09:35

## 2019-01-24 RX ADMIN — HYDROXYZINE PAMOATE PRN MG: 50 CAPSULE ORAL at 09:34

## 2019-01-24 RX ADMIN — NICOTINE POLACRILEX PRN MG: 4 GUM, CHEWING ORAL at 17:58

## 2019-01-24 RX ADMIN — NICOTINE SCH MG: 21 PATCH TRANSDERMAL at 09:34

## 2019-01-25 RX ADMIN — Medication SCH TAB: at 09:42

## 2019-01-25 RX ADMIN — NICOTINE SCH MG: 21 PATCH TRANSDERMAL at 09:41

## 2019-01-25 RX ADMIN — METHADONE HYDROCHLORIDE SCH MG: 40 TABLET ORAL at 06:35

## 2019-01-25 RX ADMIN — LIDOCAINE SCH PATCH: 50 PATCH TOPICAL at 09:42

## 2019-01-25 RX ADMIN — HYDROXYZINE PAMOATE PRN MG: 50 CAPSULE ORAL at 21:09

## 2019-01-25 RX ADMIN — CYCLOBENZAPRINE HYDROCHLORIDE SCH MG: 10 TABLET, FILM COATED ORAL at 13:29

## 2019-01-25 RX ADMIN — PANTOPRAZOLE SODIUM SCH MG: 40 TABLET, DELAYED RELEASE ORAL at 09:41

## 2019-01-25 RX ADMIN — HYDROCORTISONE SCH: 1 CREAM TOPICAL at 21:11

## 2019-01-25 RX ADMIN — GABAPENTIN SCH MG: 300 CAPSULE ORAL at 13:29

## 2019-01-25 RX ADMIN — Medication SCH: at 21:11

## 2019-01-25 RX ADMIN — Medication SCH MG: at 21:10

## 2019-01-25 RX ADMIN — GABAPENTIN SCH MG: 300 CAPSULE ORAL at 21:10

## 2019-01-25 RX ADMIN — LITHIUM CARBONATE SCH MG: 150 CAPSULE, GELATIN COATED ORAL at 21:09

## 2019-01-25 RX ADMIN — Medication SCH EACH: at 21:11

## 2019-01-25 RX ADMIN — HYDROCORTISONE SCH: 1 CREAM TOPICAL at 09:42

## 2019-01-25 RX ADMIN — LITHIUM CARBONATE SCH MG: 150 CAPSULE, GELATIN COATED ORAL at 09:41

## 2019-01-25 RX ADMIN — GABAPENTIN SCH MG: 300 CAPSULE ORAL at 06:35

## 2019-01-25 RX ADMIN — CYCLOBENZAPRINE HYDROCHLORIDE SCH MG: 10 TABLET, FILM COATED ORAL at 21:10

## 2019-01-25 RX ADMIN — VENLAFAXINE HYDROCHLORIDE SCH MG: 75 TABLET ORAL at 09:41

## 2019-01-25 RX ADMIN — CYCLOBENZAPRINE HYDROCHLORIDE SCH MG: 10 TABLET, FILM COATED ORAL at 06:35

## 2019-01-25 RX ADMIN — Medication PRN MG: at 21:10

## 2019-01-25 RX ADMIN — Medication SCH: at 09:42

## 2019-01-26 RX ADMIN — VENLAFAXINE HYDROCHLORIDE SCH MG: 75 TABLET ORAL at 09:38

## 2019-01-26 RX ADMIN — NICOTINE SCH MG: 21 PATCH TRANSDERMAL at 09:39

## 2019-01-26 RX ADMIN — CYCLOBENZAPRINE HYDROCHLORIDE SCH MG: 10 TABLET, FILM COATED ORAL at 13:10

## 2019-01-26 RX ADMIN — CYCLOBENZAPRINE HYDROCHLORIDE SCH MG: 10 TABLET, FILM COATED ORAL at 21:01

## 2019-01-26 RX ADMIN — LITHIUM CARBONATE SCH MG: 150 CAPSULE, GELATIN COATED ORAL at 09:38

## 2019-01-26 RX ADMIN — LITHIUM CARBONATE SCH MG: 150 CAPSULE, GELATIN COATED ORAL at 21:41

## 2019-01-26 RX ADMIN — HYDROXYZINE PAMOATE PRN MG: 50 CAPSULE ORAL at 09:40

## 2019-01-26 RX ADMIN — HYDROCORTISONE SCH APPLIC: 1 CREAM TOPICAL at 21:02

## 2019-01-26 RX ADMIN — METHADONE HYDROCHLORIDE SCH MG: 40 TABLET ORAL at 06:26

## 2019-01-26 RX ADMIN — HYDROCORTISONE SCH: 1 CREAM TOPICAL at 09:38

## 2019-01-26 RX ADMIN — Medication PRN APPLIC: at 17:31

## 2019-01-26 RX ADMIN — NICOTINE POLACRILEX PRN MG: 4 GUM, CHEWING ORAL at 21:06

## 2019-01-26 RX ADMIN — ALBUTEROL SULFATE PRN PUFF: 90 AEROSOL, METERED RESPIRATORY (INHALATION) at 21:41

## 2019-01-26 RX ADMIN — Medication SCH APPLIC: at 21:02

## 2019-01-26 RX ADMIN — GABAPENTIN SCH MG: 300 CAPSULE ORAL at 21:01

## 2019-01-26 RX ADMIN — GABAPENTIN SCH MG: 300 CAPSULE ORAL at 13:10

## 2019-01-26 RX ADMIN — CYCLOBENZAPRINE HYDROCHLORIDE SCH MG: 10 TABLET, FILM COATED ORAL at 06:27

## 2019-01-26 RX ADMIN — Medication SCH MG: at 21:01

## 2019-01-26 RX ADMIN — PANTOPRAZOLE SODIUM SCH MG: 40 TABLET, DELAYED RELEASE ORAL at 09:39

## 2019-01-26 RX ADMIN — HYDROXYZINE PAMOATE PRN MG: 50 CAPSULE ORAL at 21:04

## 2019-01-26 RX ADMIN — Medication SCH APPLIC: at 09:38

## 2019-01-26 RX ADMIN — LIDOCAINE SCH PATCH: 50 PATCH TOPICAL at 09:39

## 2019-01-26 RX ADMIN — Medication SCH TAB: at 09:39

## 2019-01-26 RX ADMIN — NICOTINE POLACRILEX PRN MG: 4 GUM, CHEWING ORAL at 09:41

## 2019-01-26 RX ADMIN — GABAPENTIN SCH MG: 300 CAPSULE ORAL at 06:27

## 2019-01-26 RX ADMIN — Medication SCH: at 21:03

## 2019-01-27 RX ADMIN — ACETAMINOPHEN PRN MG: 325 TABLET ORAL at 15:33

## 2019-01-27 RX ADMIN — NICOTINE SCH: 21 PATCH TRANSDERMAL at 10:01

## 2019-01-27 RX ADMIN — LITHIUM CARBONATE SCH MG: 150 CAPSULE, GELATIN COATED ORAL at 09:57

## 2019-01-27 RX ADMIN — GABAPENTIN SCH MG: 300 CAPSULE ORAL at 06:32

## 2019-01-27 RX ADMIN — HYDROXYZINE PAMOATE PRN MG: 50 CAPSULE ORAL at 00:28

## 2019-01-27 RX ADMIN — Medication SCH TAB: at 09:58

## 2019-01-27 RX ADMIN — HYDROCORTISONE SCH: 1 CREAM TOPICAL at 10:01

## 2019-01-27 RX ADMIN — ACETAMINOPHEN PRN MG: 325 TABLET ORAL at 07:42

## 2019-01-27 RX ADMIN — PSEUDOEPHEDRINE HCL AND TRIPROLIDINE HCL PRN COMBO: 60; 2.5 TABLET, FILM COATED ORAL at 07:42

## 2019-01-27 RX ADMIN — GABAPENTIN SCH MG: 300 CAPSULE ORAL at 14:18

## 2019-01-27 RX ADMIN — METHADONE HYDROCHLORIDE SCH MG: 40 TABLET ORAL at 06:31

## 2019-01-27 RX ADMIN — PANTOPRAZOLE SODIUM SCH MG: 40 TABLET, DELAYED RELEASE ORAL at 09:58

## 2019-01-27 RX ADMIN — Medication SCH MG: at 21:08

## 2019-01-27 RX ADMIN — Medication SCH: at 10:01

## 2019-01-27 RX ADMIN — HYDROCORTISONE SCH: 1 CREAM TOPICAL at 21:09

## 2019-01-27 RX ADMIN — Medication SCH EACH: at 21:09

## 2019-01-27 RX ADMIN — LITHIUM CARBONATE SCH MG: 150 CAPSULE, GELATIN COATED ORAL at 21:07

## 2019-01-27 RX ADMIN — VENLAFAXINE HYDROCHLORIDE SCH MG: 75 TABLET ORAL at 09:59

## 2019-01-27 RX ADMIN — GABAPENTIN SCH MG: 300 CAPSULE ORAL at 21:08

## 2019-01-27 RX ADMIN — CYCLOBENZAPRINE HYDROCHLORIDE SCH MG: 10 TABLET, FILM COATED ORAL at 21:08

## 2019-01-27 RX ADMIN — CYCLOBENZAPRINE HYDROCHLORIDE SCH MG: 10 TABLET, FILM COATED ORAL at 14:18

## 2019-01-27 RX ADMIN — Medication SCH: at 21:09

## 2019-01-27 RX ADMIN — LIDOCAINE SCH PATCH: 50 PATCH TOPICAL at 10:00

## 2019-01-27 RX ADMIN — CYCLOBENZAPRINE HYDROCHLORIDE SCH MG: 10 TABLET, FILM COATED ORAL at 06:32

## 2019-01-28 RX ADMIN — LITHIUM CARBONATE SCH MG: 150 CAPSULE, GELATIN COATED ORAL at 10:10

## 2019-01-28 RX ADMIN — METHADONE HYDROCHLORIDE SCH MG: 40 TABLET ORAL at 06:51

## 2019-01-28 RX ADMIN — CYCLOBENZAPRINE HYDROCHLORIDE SCH MG: 10 TABLET, FILM COATED ORAL at 21:02

## 2019-01-28 RX ADMIN — VENLAFAXINE HYDROCHLORIDE SCH MG: 75 TABLET ORAL at 09:31

## 2019-01-28 RX ADMIN — ACETAMINOPHEN PRN MG: 325 TABLET ORAL at 03:12

## 2019-01-28 RX ADMIN — AZITHROMYCIN SCH MG: 250 TABLET, FILM COATED ORAL at 09:29

## 2019-01-28 RX ADMIN — HYDROCORTISONE SCH: 1 CREAM TOPICAL at 09:31

## 2019-01-28 RX ADMIN — PSEUDOEPHEDRINE HCL AND TRIPROLIDINE HCL PRN COMBO: 60; 2.5 TABLET, FILM COATED ORAL at 07:24

## 2019-01-28 RX ADMIN — ACETAMINOPHEN PRN MG: 325 TABLET ORAL at 19:06

## 2019-01-28 RX ADMIN — CYCLOBENZAPRINE HYDROCHLORIDE SCH MG: 10 TABLET, FILM COATED ORAL at 06:52

## 2019-01-28 RX ADMIN — HYDROCORTISONE SCH: 1 CREAM TOPICAL at 21:03

## 2019-01-28 RX ADMIN — PANTOPRAZOLE SODIUM SCH MG: 40 TABLET, DELAYED RELEASE ORAL at 09:28

## 2019-01-28 RX ADMIN — Medication SCH EACH: at 21:03

## 2019-01-28 RX ADMIN — GABAPENTIN SCH MG: 300 CAPSULE ORAL at 21:02

## 2019-01-28 RX ADMIN — ACETAMINOPHEN PRN MG: 325 TABLET ORAL at 07:22

## 2019-01-28 RX ADMIN — GABAPENTIN SCH MG: 300 CAPSULE ORAL at 15:02

## 2019-01-28 RX ADMIN — GABAPENTIN SCH MG: 300 CAPSULE ORAL at 06:52

## 2019-01-28 RX ADMIN — LIDOCAINE SCH PATCH: 50 PATCH TOPICAL at 09:28

## 2019-01-28 RX ADMIN — Medication SCH TAB: at 09:27

## 2019-01-28 RX ADMIN — Medication SCH APPLIC: at 21:03

## 2019-01-28 RX ADMIN — NICOTINE SCH: 21 PATCH TRANSDERMAL at 09:28

## 2019-01-28 RX ADMIN — IBUPROFEN PRN MG: 400 TABLET, FILM COATED ORAL at 10:11

## 2019-01-28 RX ADMIN — LITHIUM CARBONATE SCH MG: 150 CAPSULE, GELATIN COATED ORAL at 21:02

## 2019-01-28 RX ADMIN — Medication SCH MG: at 21:01

## 2019-01-28 RX ADMIN — CYCLOBENZAPRINE HYDROCHLORIDE SCH MG: 10 TABLET, FILM COATED ORAL at 15:02

## 2019-01-28 RX ADMIN — Medication SCH: at 09:31

## 2019-01-28 RX ADMIN — HYDROXYZINE PAMOATE PRN MG: 50 CAPSULE ORAL at 21:02

## 2019-01-28 NOTE — PN
[facility] Progress Note


Note: 


PATIENT SEEN DUE TO ELEVATED TEMPERATURE THIS MORNING. PATIENT ORDERED 

AZITHROMYCIN AND GIVEN MOTRIN FOR TEMPERATURE. PATIENT EVALUATED AT BEDSIDE AND 

DENIES SORE THROAT, COUGH, CHEST PAIN AND SOB. + C/O HEADACHE. 


 Laboratory Tests











  01/14/19 01/15/19 01/15/19





  15:29 05:45 05:45


 


WBC   4.3 


 


RBC   4.19 


 


Hgb   12.3 


 


Hct   37.7 


 


MCV   90.1 


 


MCH   29.4 


 


MCHC   32.6 


 


RDW   15.0 


 


Plt Count   165 


 


MPV   10.3 


 


Sodium    140


 


Potassium    4.0


 


Chloride    105


 


Carbon Dioxide    32


 


Anion Gap    4 L


 


BUN    9


 


Creatinine    0.8


 


Creat Clearance w eGFR    > 60


 


Random Glucose    74


 


Calcium    8.9


 


Total Bilirubin    0.3


 


AST    25


 


ALT    29


 


Alkaline Phosphatase    84


 


Total Protein    7.5


 


Albumin    3.7


 


Urine Color  Straw  


 


Urine Appearance  Clear  


 


Urine pH  7.0  


 


Ur Specific Gravity  1.009 L  


 


Urine Protein  Negative  


 


Urine Glucose (UA)  Negative  


 


Urine Ketones  Negative  


 


Urine Blood  Negative  


 


Urine Nitrite  Negative  


 


Urine Bilirubin  Negative  


 


Urine Urobilinogen  Negative  


 


Ur Leukocyte Esterase  Trace  


 


Urine WBC (Auto)  <1  


 


Urine RBC (Auto)  <1  


 


Ur Epithelial Cells  Rare  


 


Lithium   


 


RPR Titer   














  01/15/19 01/15/19 01/18/19





  05:45 05:45 09:55


 


WBC   


 


RBC   


 


Hgb   


 


Hct   


 


MCV   


 


MCH   


 


MCHC   


 


RDW   


 


Plt Count   


 


MPV   


 


Sodium   


 


Potassium   


 


Chloride   


 


Carbon Dioxide   


 


Anion Gap   


 


BUN   


 


Creatinine   


 


Creat Clearance w eGFR   


 


Random Glucose   


 


Calcium   


 


Total Bilirubin   


 


AST   


 


ALT   


 


Alkaline Phosphatase   


 


Total Protein   


 


Albumin   


 


Urine Color   


 


Urine Appearance   


 


Urine pH   


 


Ur Specific Gravity   


 


Urine Protein   


 


Urine Glucose (UA)   


 


Urine Ketones   


 


Urine Blood   


 


Urine Nitrite   


 


Urine Bilirubin   


 


Urine Urobilinogen   


 


Ur Leukocyte Esterase   


 


Urine WBC (Auto)   


 


Urine RBC (Auto)   


 


Ur Epithelial Cells   


 


Lithium   1.3 H  0.6


 


RPR Titer  Nonreactive  








 Vital Signs











Temperature  101.1 F H  01/28/19 10:03


 


Pulse Rate  99 H  01/28/19 07:15


 


Respiratory Rate  18   01/28/19 07:15


 


Blood Pressure  120/82   01/28/19 07:15


 


O2 Sat by Pulse Oximetry (%)      








PE:





ALERT AND ORIENTED X 3


SKIN WARM AND DRY


+PERRLA, EOMS INTACT BL


CAR S1S2


RESP FAINT WHEEZE, LEFT UPPER LOBE, NO RALES, RHONCHI


EXT FULL ROM


AMB AD OLGA








A/P:





FEVER


SINUSITIS











WILL CONTINUE ABX AS PREVIOUSLY ORDERED


ENCOURAGE ORAL FLUIDS


HOLD D/C TODAY


OBSERVE FOR 24 HRS


CONTINUE MOTRIN PRN FOR FEVER


CONTINUE TO MONITOR

## 2019-01-28 NOTE — PN
[facility] Progress Note (SOAP)


Subjective: 





SEEN FOR C/O OF FEVER X2 DAYS. CLIENT STATES SHE HAS HAD FEVER X 2 DAYS WITH C/

O HEADACHE AND PRESSURE TO HERE HEAD. +SOB, +FEVER, +CHILLS. DENIES C.P., N/V/D

, COUGH


Objective: 





01/28/19 07:35


 Vital Signs - 24 hr











  01/27/19 01/27/19 01/28/19





  09:06 21:34 07:13


 


Temperature 99.5 F 98.7 F 101.4 F H


 


Pulse Rate 85 88 99 H


 


Respiratory 19 20 18





Rate   


 


Blood Pressure 120/76 110/77 120/82














  01/28/19





  07:15


 


Temperature 100.2 F H


 


Pulse Rate 99 H


 


Respiratory 18





Rate 


 


Blood Pressure 120/82








 Laboratory Tests











  01/14/19 01/15/19 01/15/19





  15:29 05:45 05:45


 


WBC   4.3 


 


RBC   4.19 


 


Hgb   12.3 


 


Hct   37.7 


 


MCV   90.1 


 


MCH   29.4 


 


MCHC   32.6 


 


RDW   15.0 


 


Plt Count   165 


 


MPV   10.3 


 


Sodium    140


 


Potassium    4.0


 


Chloride    105


 


Carbon Dioxide    32


 


Anion Gap    4 L


 


BUN    9


 


Creatinine    0.8


 


Creat Clearance w eGFR    > 60


 


Random Glucose    74


 


Calcium    8.9


 


Total Bilirubin    0.3


 


AST    25


 


ALT    29


 


Alkaline Phosphatase    84


 


Total Protein    7.5


 


Albumin    3.7


 


Urine Color  Straw  


 


Urine Appearance  Clear  


 


Urine pH  7.0  


 


Ur Specific Gravity  1.009 L  


 


Urine Protein  Negative  


 


Urine Glucose (UA)  Negative  


 


Urine Ketones  Negative  


 


Urine Blood  Negative  


 


Urine Nitrite  Negative  


 


Urine Bilirubin  Negative  


 


Urine Urobilinogen  Negative  


 


Ur Leukocyte Esterase  Trace  


 


Urine WBC (Auto)  <1  


 


Urine RBC (Auto)  <1  


 


Ur Epithelial Cells  Rare  


 


Lithium   


 


RPR Titer   














  01/15/19 01/15/19 01/18/19





  05:45 05:45 09:55


 


WBC   


 


RBC   


 


Hgb   


 


Hct   


 


MCV   


 


MCH   


 


MCHC   


 


RDW   


 


Plt Count   


 


MPV   


 


Sodium   


 


Potassium   


 


Chloride   


 


Carbon Dioxide   


 


Anion Gap   


 


BUN   


 


Creatinine   


 


Creat Clearance w eGFR   


 


Random Glucose   


 


Calcium   


 


Total Bilirubin   


 


AST   


 


ALT   


 


Alkaline Phosphatase   


 


Total Protein   


 


Albumin   


 


Urine Color   


 


Urine Appearance   


 


Urine pH   


 


Ur Specific Gravity   


 


Urine Protein   


 


Urine Glucose (UA)   


 


Urine Ketones   


 


Urine Blood   


 


Urine Nitrite   


 


Urine Bilirubin   


 


Urine Urobilinogen   


 


Ur Leukocyte Esterase   


 


Urine WBC (Auto)   


 


Urine RBC (Auto)   


 


Ur Epithelial Cells   


 


Lithium   1.3 H  0.6


 


RPR Titer  Nonreactive  





REPEATED TEMP 100.2


A/O X3 NAD


HEENT- NCAT +TENDERNESS TO FRONTAL SINUSES AND ETHMOID SINUSES


CV RR-TACHY- O2 SAT 90% RA


LUNGS- MILD INSPIRATORY WHEEZING


01/28/19 07:43





01/28/19 07:46





Assessment: 





01/28/19 07:39


SINUSITIS, ASTHMA, URI


Plan: 


TYLENOL NOW


ACTIFED AS ORDERED


DUONEB X 1


ZITHROMAX 500 MG DAILY X 3 DAYS- CLIENT IS SCHEDULE FOR DC TODAY. SHE IS IN 

NAD. WILL GIVE 1 DOSE NOW. 2 REMAINING DOSE SENT TO PT PREFERRED PHARMACY 

TRUNG. 


D/W CLIENT TO F/U WITH PCP AFTER DC. INFORMED HER TO SEEKING URGENT CARE OR ER 

SERVICES FOR WORSENING/ UNRELIEVED SYMPTOMS TO INCLUDE SOB, C.P. FEVER. CLIENT 

VERBALIZED UNDERSTANDING

## 2019-01-29 VITALS — DIASTOLIC BLOOD PRESSURE: 73 MMHG | SYSTOLIC BLOOD PRESSURE: 105 MMHG | TEMPERATURE: 98.1 F | HEART RATE: 76 BPM

## 2019-01-29 RX ADMIN — Medication SCH APPLIC: at 09:11

## 2019-01-29 RX ADMIN — NICOTINE SCH: 21 PATCH TRANSDERMAL at 09:12

## 2019-01-29 RX ADMIN — HYDROCORTISONE SCH APPLIC: 1 CREAM TOPICAL at 09:11

## 2019-01-29 RX ADMIN — CYCLOBENZAPRINE HYDROCHLORIDE SCH MG: 10 TABLET, FILM COATED ORAL at 06:03

## 2019-01-29 RX ADMIN — LIDOCAINE SCH PATCH: 50 PATCH TOPICAL at 09:11

## 2019-01-29 RX ADMIN — Medication SCH TAB: at 09:12

## 2019-01-29 RX ADMIN — VENLAFAXINE HYDROCHLORIDE SCH MG: 75 TABLET ORAL at 09:10

## 2019-01-29 RX ADMIN — AZITHROMYCIN SCH MG: 250 TABLET, FILM COATED ORAL at 09:12

## 2019-01-29 RX ADMIN — METHADONE HYDROCHLORIDE SCH MG: 40 TABLET ORAL at 06:03

## 2019-01-29 RX ADMIN — GABAPENTIN SCH MG: 300 CAPSULE ORAL at 06:03

## 2019-01-29 RX ADMIN — PANTOPRAZOLE SODIUM SCH MG: 40 TABLET, DELAYED RELEASE ORAL at 09:11

## 2019-01-29 RX ADMIN — LITHIUM CARBONATE SCH MG: 150 CAPSULE, GELATIN COATED ORAL at 09:10

## 2019-01-29 NOTE — PN
[facility] Progress Note


Note: 





MEDICAL DISCHARGE NOTES:


PT WAS SEEN THIS MORNING AND SHE REPORTS SHE IS READY TO BE DISCHARGED. HOWEVER 

PT C/O "DRY THROAT AND THIRSTY". PT SHOWED A THICK BLOOD TINGED MUCUS SHE 

COUGHED UP IN THE GARBAGE. SHE REPORTS THAT HAPPENED ONLY ONE TIME. PT WAS 

SPOKEN TO BY THIS WRITER AND COUNSELLING SUPERVISOR, MS PHELPS SUGGESTING  SHE 

WILL BE TAKEN TO [facility] ER FOR EVALUATION DUE TO PREVIOUS DAY'S ELEVATED 

TEMPERATURE. PT DECLINED AND STATES HER PRIMARY CARE DOCTOR IS ACROSS FROM HER 

RESIDENCE AND SHE WILL FOLLOW UP IF SHE NEEDS TO DO SO "BECAUSE IT HAPPENED 

ONLY ONE TIME". PT HAS BEEN  AFEBRILE NOW  X 24 HRS. NAD. PT SIGNED REFUSAL FOR 

FURTHER EVALUATION BEFORE DISCHARGING. PT REPORTS SHE HAS A  PMD, DR. DANIEL 

AT [facility] ON [address] [place], [place] WHO SHE WANTS TO SEE 

AFTER SHE LEAVES HERE TODAY. CALLED AND SPOKE TO [facility] STAFF FOR MEDICAL UPDATE 

ON PATIENT.


 


 


 


 Vital Signs - 24 hr














  01/29/19 01/29/19





  03:30 06:43


 


Temperature  98.1 F


 


Pulse Rate  76


 


Respiratory 19 18





Rate  


 


Blood Pressure  105/73





CARDIAC:S1 S2, RRR


 


LUNGS:CTA





PLAN:PT TO FOLLOW UP AT [facility] AS RECOMMENDED AND SEE DR. DANIEL TODAY.


INSTRUCTED INCREASE PO FLUIDS.

## 2023-10-11 NOTE — PN
[facility] Progress Note


Note: 





PATIENT SCHEDULED FOR D/C 1/28/19. PATIENT TO CONTINUE WITH OUTPATIENT SERVICES 

AT [facility]. PATIENT HAS MEDICATIONS IN PROPERTY AS FILLED 1/2/19 BY [facility]. PRESCRIPTIONS AND DATE FILLED VERIFIED WITH PHARMACY AS THE SAME. 

PATIENT ENCOURAGED TO FOLLOW UP WITH PCP WITHIN ONE WEEK OF DISCHARGE AND TO 

CONTINUE WITH OUTPATIENT ADDICTION SERVICES AT [facility]. PATIENT SENT NARCAN KIT TO 

PREFERRED PHARMACY. PATIENT IS MEDICALLY STABLE AT THIS TIME AND CLEARED FOR D/

C ON MONDAY AS SCHEDULED. PATIENT DENIES SI/HI AND STATES ALL GOALS MET BY 

REHAB PROGRAM. 


 Vital Signs











Temperature  97.7 F   01/25/19 06:53


 


Pulse Rate  67   01/25/19 06:53


 


Respiratory Rate  18   01/25/19 06:53


 


Blood Pressure  134/85   01/25/19 06:53


 


O2 Sat by Pulse Oximetry (%) None Discussed with Patient regarding the X-ray findings.  Discussed the risks of occult / secondary fracture or ligamentous/tendon injury. Discussed risks of shoulder injuries and need for close follow-up.  Discussed the importance of close prompt follow-up with Orthopaedics for definitive workup and treatment.  Also discussed injury / neuro precautions.  Verbalization of understanding of all instructions was shown and an opportunity was given for questions.  All results were explained to patient and a copy of all available results given.